# Patient Record
Sex: FEMALE | Race: BLACK OR AFRICAN AMERICAN | Employment: FULL TIME | URBAN - METROPOLITAN AREA
[De-identification: names, ages, dates, MRNs, and addresses within clinical notes are randomized per-mention and may not be internally consistent; named-entity substitution may affect disease eponyms.]

---
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
## 2023-01-10 ENCOUNTER — HOSPITAL ENCOUNTER (EMERGENCY)
Age: 18
Discharge: HOME | End: 2023-01-10
Attending: STUDENT IN AN ORGANIZED HEALTH CARE EDUCATION/TRAINING PROGRAM

## 2023-01-10 VITALS
WEIGHT: 117.5 LBS | OXYGEN SATURATION: 100 % | SYSTOLIC BLOOD PRESSURE: 117 MMHG | RESPIRATION RATE: 16 BRPM | HEART RATE: 60 BPM | DIASTOLIC BLOOD PRESSURE: 85 MMHG | TEMPERATURE: 98.6 F | HEIGHT: 65 IN | BODY MASS INDEX: 19.58 KG/M2

## 2023-01-10 DIAGNOSIS — K59.09 OTHER CONSTIPATION: ICD-10-CM

## 2023-01-10 DIAGNOSIS — R10.13 ABDOMINAL PAIN, EPIGASTRIC: Primary | ICD-10-CM

## 2023-01-10 LAB
ANION GAP SERPL CALC-SCNC: 10 MMOL/L (ref 5–15)
APPEARANCE UR: CLEAR
BACTERIA URNS QL MICRO: ABNORMAL /HPF
BASOPHILS # BLD: 0 K/UL (ref 0–0.1)
BASOPHILS NFR BLD: 1 % (ref 0–1)
BILIRUB UR QL: NEGATIVE
BUN SERPL-MCNC: 5 MG/DL (ref 6–20)
BUN/CREAT SERPL: 6 (ref 12–20)
CALCIUM SERPL-MCNC: 9.5 MG/DL (ref 8.5–10.1)
CHLORIDE SERPL-SCNC: 101 MMOL/L (ref 97–108)
CO2 SERPL-SCNC: 28 MMOL/L (ref 21–32)
COLOR UR: ABNORMAL
CREAT SERPL-MCNC: 0.87 MG/DL (ref 0.55–1.02)
DIFFERENTIAL METHOD BLD: ABNORMAL
EOSINOPHIL # BLD: 0.1 K/UL (ref 0–0.4)
EOSINOPHIL NFR BLD: 2 % (ref 0–7)
EPITH CASTS URNS QL MICRO: ABNORMAL /LPF
ERYTHROCYTE [DISTWIDTH] IN BLOOD BY AUTOMATED COUNT: 11.7 % (ref 11.5–14.5)
GLUCOSE SERPL-MCNC: 94 MG/DL (ref 65–100)
GLUCOSE UR STRIP.AUTO-MCNC: NEGATIVE MG/DL
HCG UR QL: NEGATIVE
HCT VFR BLD AUTO: 42.3 % (ref 35–47)
HGB BLD-MCNC: 14.4 G/DL (ref 11.5–16)
HGB UR QL STRIP: NEGATIVE
IMM GRANULOCYTES # BLD AUTO: 0 K/UL (ref 0–0.04)
IMM GRANULOCYTES NFR BLD AUTO: 1 % (ref 0–0.5)
KETONES UR QL STRIP.AUTO: NEGATIVE MG/DL
LEUKOCYTE ESTERASE UR QL STRIP.AUTO: ABNORMAL
LIPASE SERPL-CCNC: 110 U/L (ref 73–393)
LYMPHOCYTES # BLD: 1.4 K/UL (ref 0.8–3.5)
LYMPHOCYTES NFR BLD: 36 % (ref 12–49)
MCH RBC QN AUTO: 28.9 PG (ref 26–34)
MCHC RBC AUTO-ENTMCNC: 34 G/DL (ref 30–36.5)
MCV RBC AUTO: 84.9 FL (ref 80–99)
MONOCYTES # BLD: 0.6 K/UL (ref 0–1)
MONOCYTES NFR BLD: 14 % (ref 5–13)
NEUTS SEG # BLD: 1.8 K/UL (ref 1.8–8)
NEUTS SEG NFR BLD: 46 % (ref 32–75)
NITRITE UR QL STRIP.AUTO: NEGATIVE
NRBC # BLD: 0 K/UL (ref 0–0.01)
NRBC BLD-RTO: 0 PER 100 WBC
PH UR STRIP: 6.5 (ref 5–8)
PLATELET # BLD AUTO: 262 K/UL (ref 150–400)
PMV BLD AUTO: 10.8 FL (ref 8.9–12.9)
POTASSIUM SERPL-SCNC: 4.4 MMOL/L (ref 3.5–5.1)
PROT UR STRIP-MCNC: NEGATIVE MG/DL
RBC # BLD AUTO: 4.98 M/UL (ref 3.8–5.2)
RBC #/AREA URNS HPF: ABNORMAL /HPF (ref 0–5)
SODIUM SERPL-SCNC: 139 MMOL/L (ref 136–145)
SP GR UR REFRACTOMETRY: <1.005
UROBILINOGEN UR QL STRIP.AUTO: 0.2 EU/DL (ref 0.2–1)
WBC # BLD AUTO: 3.9 K/UL (ref 3.6–11)
WBC URNS QL MICRO: ABNORMAL /HPF (ref 0–4)

## 2023-01-10 PROCEDURE — 83690 ASSAY OF LIPASE: CPT

## 2023-01-10 PROCEDURE — 81025 URINE PREGNANCY TEST: CPT

## 2023-01-10 PROCEDURE — 74011000250 HC RX REV CODE- 250: Performed by: PHYSICIAN ASSISTANT

## 2023-01-10 PROCEDURE — 81001 URINALYSIS AUTO W/SCOPE: CPT

## 2023-01-10 PROCEDURE — 74011250637 HC RX REV CODE- 250/637: Performed by: PHYSICIAN ASSISTANT

## 2023-01-10 PROCEDURE — 99283 EMERGENCY DEPT VISIT LOW MDM: CPT

## 2023-01-10 PROCEDURE — 80048 BASIC METABOLIC PNL TOTAL CA: CPT

## 2023-01-10 PROCEDURE — 85025 COMPLETE CBC W/AUTO DIFF WBC: CPT

## 2023-01-10 RX ORDER — FAMOTIDINE 20 MG/1
20 TABLET, FILM COATED ORAL 2 TIMES DAILY
Qty: 20 TABLET | Refills: 0 | Status: SHIPPED | OUTPATIENT
Start: 2023-01-10 | End: 2023-01-20

## 2023-01-10 RX ORDER — POLYETHYLENE GLYCOL 1000
1 POWDER (GRAM) MISCELLANEOUS
Qty: 500 G | Refills: 0 | Status: SHIPPED | OUTPATIENT
Start: 2023-01-10 | End: 2023-01-15

## 2023-01-10 RX ORDER — MAG HYDROX/ALUMINUM HYD/SIMETH 200-200-20
30 SUSPENSION, ORAL (FINAL DOSE FORM) ORAL
Qty: 354 ML | Refills: 0 | Status: SHIPPED | OUTPATIENT
Start: 2023-01-10 | End: 2023-01-15

## 2023-01-10 RX ORDER — FAMOTIDINE 20 MG/1
20 TABLET, FILM COATED ORAL
Status: COMPLETED | OUTPATIENT
Start: 2023-01-10 | End: 2023-01-10

## 2023-01-10 RX ADMIN — LIDOCAINE HYDROCHLORIDE 40 ML: 20 SOLUTION TOPICAL at 18:36

## 2023-01-10 RX ADMIN — FAMOTIDINE 20 MG: 20 TABLET ORAL at 18:35

## 2023-01-10 NOTE — ED NOTES
Pt presents to ED ambulatory complaining of abd pain with nausea x 3-4 days. Pt is alert and oriented x 4, RR even and unlabored, skin is warm and dry. Assessment completed and pt updated on plan of care. Call bell in reach. Emergency Department Nursing Plan of Care       The Nursing Plan of Care is developed from the Nursing assessment and Emergency Department Attending provider initial evaluation. The plan of care may be reviewed in the ED Provider note.     The Plan of Care was developed with the following considerations:   Patient / Family readiness to learn indicated by:verbalized understanding  Persons(s) to be included in education: patient  Barriers to Learning/Limitations:No    Signed     Pricilan Post    1/10/2023   5:59 PM

## 2023-01-10 NOTE — ED PROVIDER NOTES
[facility] EMERGENCY DEPT  EMERGENCY DEPARTMENT ENCOUNTER       Pt Name: Alondra Awad  MRN: [id number]  [place] 2005  Date of evaluation: 1/10/2023  Provider: CHRISTO Gan   PCP: Silvina Roberts MD  Note Started: 6:12 PM 1/10/23     ED attending involment: I have seen and evaluated the patient. My supervision physician was available for consultation. CHIEF COMPLAINT       Chief Complaint   Patient presents with    Abdominal Pain        HISTORY OF PRESENT ILLNESS: 1 or more elements      History From: Patient  HPI Limitations : None     Alondra Awad is a 21 y.o. female who presents to the ED with epigastric pain. This began gradually 4 days ago. She describes a tight, achy pain that has been intermittent begins approximately 5 to 10 minutes after eating. This lasts approximately 2 hours and then resolve spontaneously. She has had decreased appetite as a result. She does report constipation, and had a small bowel movement yesterday. She has not taken any medications for symptoms. She denies any other symptoms, to include fevers, headaches, dizziness, myalgias, chest pain, shortness of breath, cough, nausea, vomiting, diarrhea, hematochezia, melena, dysuria, hematuria, rash. She denies any illicit drug or alcohol use. She denies any past surgeries. Nursing Notes were all reviewed and agreed with or any disagreements were addressed in the HPI. REVIEW OF SYSTEMS      Review of Systems   Constitutional:  Negative for chills and fever. HENT:  Negative for congestion and sore throat. Respiratory:  Negative for cough and shortness of breath. Cardiovascular:  Negative for chest pain. Gastrointestinal:  Positive for abdominal pain. Negative for diarrhea, nausea and vomiting. Genitourinary:  Negative for dysuria and hematuria. Musculoskeletal:  Negative for myalgias. Skin:  Negative for rash. Neurological:  Negative for headaches. All other systems reviewed and are negative. Positives and Pertinent negatives as per HPI. PAST HISTORY     Past Medical History:  History reviewed. No pertinent past medical history. Past Surgical History:  No past surgical history on file. Family History:  History reviewed. No pertinent family history. Social History:  Social History     Tobacco Use    Smoking status: Unknown   Substance Use Topics    Drug use: Never       Allergies:  No Known Allergies    CURRENT MEDICATIONS      Previous Medications    DICYCLOMINE (BENTYL) 20 MG TABLET    Take 1 Tablet by mouth every six (6) hours as needed for Abdominal Cramps. ONDANSETRON (ZOFRAN ODT) 4 MG DISINTEGRATING TABLET    Take 1 Tablet by mouth every eight (8) hours as needed for Nausea or Vomiting. PHYSICAL EXAM      ED Triage Vitals [01/10/23 1559]   ED Encounter Vitals Group      /85      Pulse (Heart Rate) 60      Resp Rate 16      Temp 98.6 °F (37 °C)      Temp src       O2 Sat (%) 100 %      Weight 117 lb 8.1 oz      Height 5' 5\"        Physical Exam  Vitals and nursing note reviewed. Constitutional:       Appearance: Normal appearance. Comments: Patient well-appearing, nontoxic. She is resting comfortably in stretcher   HENT:      Head: Normocephalic and atraumatic. Right Ear: External ear normal.      Left Ear: External ear normal.      Nose: Nose normal.      Mouth/Throat:      Pharynx: Oropharynx is clear. Eyes:      Conjunctiva/sclera: Conjunctivae normal.   Cardiovascular:      Rate and Rhythm: Normal rate and regular rhythm. Pulses: Normal pulses. Pulmonary:      Effort: Pulmonary effort is normal.      Breath sounds: Normal breath sounds. Abdominal:      General: Abdomen is flat. Palpations: Abdomen is soft. Comments: Abdomen normal to inspection. Mild epigastric tenderness. No focal tenderness otherwise. No guarding, rigidity or rebound tenderness. No McBurney's point tenderness. Negative Bullock sign.   No CVA tenderness bilaterally. Musculoskeletal:         General: No swelling. Cervical back: Normal range of motion and neck supple. Skin:     General: Skin is warm. Capillary Refill: Capillary refill takes less than 2 seconds. Findings: No erythema or rash. Neurological:      General: No focal deficit present. Mental Status: She is alert. Psychiatric:         Mood and Affect: Mood normal.         Behavior: Behavior normal.         Thought Content: Thought content normal.         Judgment: Judgment normal.        DIAGNOSTIC RESULTS   LABS:     No results found for this or any previous visit (from the past 12 hour(s)). RADIOLOGY:  Non-plain film images such as CT, Ultrasound and MRI are read by the radiologist. Plain radiographic images are visualized and preliminarily interpreted by the ED Provider with the below findings:        Interpretation per the Radiologist below, if available at the time of this note:     No results found. PROCEDURES   Unless otherwise noted below, none  Procedures     EMERGENCY DEPARTMENT COURSE and DIFFERENTIAL DIAGNOSIS/MDM   Vitals:    Vitals:    01/10/23 1559   BP: 117/85   Pulse: 60   Resp: 16   Temp: 98.6 °F (37 °C)   SpO2: 100%   Weight: 53.3 kg (117 lb 8.1 oz)   Height: 5' 5\" (1.651 m)        Patient was given the following medications:  Medications   mylanta/viscous lidocaine (GI COCKTAIL) (has no administration in time range)   famotidine (PEPCID) tablet 20 mg (has no administration in time range)       CONSULTS: (Who and What was discussed)  None    Chronic Conditions: none    Social Determinants affecting Dx or Tx: None    Records Reviewed (source and summary): Nursing Notes    MDM (CC/HPI Summary, DDx, ED Course, Reassessment, Disposition Considerations -Tests not done, Shared Decision Making, Pt Expectation of Test or Tx.): Patient evaluated for a 4-day history of an achy epigastric pain that is provoked with eating.   She is otherwise healthy with no comorbidities. She is afebrile, well-appearing with a benign abdominal exam.  Differential diagnosis includes but not limited to, dyspepsia, PUD, hepatobiliary disease, pancreatitis , costochondritis, pneumonia, ACS, hernia, appendicitis. patient not have any red flag symptoms for an acute intra-abdominal process. Given her young age, lack of risk factors, and symptoms that are inconsistent with a cardiopulmonary process, I have low suspicion for this requiring further work-up or evaluation in the ED. Patient's symptoms are easily provoked with eating, and I suspect is likely gastritis versus PUD. Her lab work is unremarkable with no signs of gallbladder or hepatobiliary involvement. Lipase within normal limits. Patient having mild constipation but no evidence of bowel obstruction. She was advised on symptomatic care and will be treated polyethylene glycol. Patient advised on the diagnostic uncertainty of her work-up and the need to follow-up with PCP for further evaluation. Through shared decision making will defer CT imaging at this time. We will being treated empirically for gastritis. She was given return precautions to the ED. Patient expressed understanding agreement the discharge structures and treatment plan             FINAL IMPRESSION   No diagnosis found. DISPOSITION/PLAN           Care plan outlined and precautions discussed. Patient has no new complaints, changes, or physical findings. Results of labs were reviewed with the patient. All medications were reviewed with the patient; will d/c home with Pepcid, Mylanta, polyethylene glycol. All of pt's questions and concerns were addressed. Patient was instructed and agrees to follow up with PCP, as well as to return to the ED upon further deterioration. Patient is ready to go home.            PATIENT REFERRED TO:  Follow-up Information    None           DISCHARGE MEDICATIONS:  Current Discharge Medication List            DISCONTINUED MEDICATIONS:  Current Discharge Medication List          I am the Primary Clinician of Record. CHRISTO Arredondo (electronically signed)    (Please note that parts of this dictation were completed with voice recognition software. Quite often unanticipated grammatical, syntax, homophones, and other interpretive errors are inadvertently transcribed by the computer software. Please disregards these errors.  Please excuse any errors that have escaped final proofreading.)

## 2023-01-10 NOTE — ED TRIAGE NOTES
Pt presents to ED reporting mid, upper abdominal pain x 3-4 days. Denies V/D, endorses nausea. Denies concern for pregnancy. Reports irregular bowel movements, last BM 1/9/2023. Describes the pain as \"tight\" like her stomach is bloated.

## 2023-01-11 NOTE — DISCHARGE INSTRUCTIONS
Local Primary Care Physicians     [facility] [phone]   MD Laura Tracy MD Chevis Milch, MD    [facility] Doctors [phone]   Saba Doan, [facility]   MD Roberta Corona MD Hermine Deeds, MD [address]  [phone]   John Arita, MD Gasper Marcelo MD     [address] [facility] [phone]   MD Anirudh Borja MD Briana Rower, MD Luberta Pastel, MD     [facility] [phone]   [facility] SPQQJR KK, MD Carmen Traylor, MD Clara Babinski, NP    [address] [phone]   MD Noe Ralph MD Mallory Adas, MD Cuca Vega, MD Destiny Warner, MD Carlos Ahmadi MD     [address] 57 [facility] MD Reji    [facility] [phone]   MD Tan Melvin, NP   Carron Moritz, MD [address] MD Lawanna Leech, MD     [address] [facility] [phone]   MD Krishna Bearden, P   Andie Ritter, NP   Malissa Leary, MD Kirill Matos, MD Katelyn Sinclair, MD Jose Nagy MD    [facility] [phone]   MD Gabby Campoverde MD Selinda Lob, MD Arleta Ast, MD Viktoria Pickup, MD     [address] [phone]   MD Jose Armando Lloyd MD [place] [phone]   MD Tali Louise MD Jannet Dear, MD     [facility] [phone]   Sheilah Raman, MD Ardith Apley, MD Yomi Kang, MD Waldo Bliss, MD Aissatou Talley, MD Junior Iglesias, NP   Luisana Luther MD     [address] [place] [address]   [phone]   MD Mary Bloom MD     [address] [facility], MD Rojelio Moya, SHELDON Nolan MAYRA Castillo FNP Nona Sierras, PA-C Myla Arms, MD Woody Copes, YAS Davis, [address] [place] [address] Departments    For adult and child immunizations, family planning, TB screening, STD testing and women's health services. [facility] CENTER:  [place]  [phone]   [place]  [address]  [facility] 1822   [facility]   729 [facility]:  [facility]  [address] [facility] [phone]   [address] [facility]        [address] [address]    For primary care services, woman and child wellness, and some clinics providing specialty care. [facility] -- [facility].   [facility]5 [facility]  [phone]    [address] [place] [address]   200 [facility] 3614 [facility] [phone]    339 [facility]  [address] [place] [address]   [phone]    [address]  [employer] [address] [place] [address]  5850 [facility]    [phone]    [facility] [address] [address] [place]  [phone]    [facility]  [address] [facility]  [phone]    [address] [phone]    [facility]:  [facility] [address], [address] [facility] [phone]  [place] [address] [phone]    [facility] Outreach   58[address] [facility]  [phone]  [employer]  [address], [address] ([email or web address]) [phone]       Sexual Health/Woman Wellness Clinics   For STD/HIV testing and treatment, pregnancy testing and services, men's health, birth control services, LGBT services, and hepatitis/HPV vaccine services. [facility] [employer] [address] [facility] 1900 [facility] [address] [facility] [address]  Leticia Kaba [phone]  [address], 5th floor [phone]  Pregnancy \[facility] [place] [facility]\"" of 59 [facility] [address] [phone]       8260 [facility] 401 [facility],[address]   [phone]  [place]   [phone]    Women, Infant and Children's Services:   [address] [phone]  [address] [phone]  [facility]   [phone]  [address] [place] [address]   [phone]  [facility]     [phone]  [address] Crisis   [address] [phone]        Miscellaneous: Thank you for allowing us to provide you with excellent care today. We hope we addressed all of your concerns and needs. We strive to provide excellent quality care in the Emergency Department. Please rate us as excellent, as anything less than excellent does not meet our expectations. If you feel that you have not received excellent quality care or timely care, please ask to speak to the nurse manager. Please choose us in the future for your continued health care needs. The exam and treatment you received in the Emergency Department were for an urgent problem and are not intended as complete care. It is important that you follow up with a doctor, nurse practitioner, or physician assistant for ongoing care. If your symptoms become worse or you do not improve as expected and you are unable to reach your usual health care provider, you should return to the Emergency Department. We are available 24 hours a day. Take this sheet with you when you go to your follow-up visit. If you have any problem arranging the follow-up visit, contact the Emergency Department immediately. If a prescription has been provided, please have it filled as soon as possible to avoid a delay in treatment. Read the entire medication instruction sheet provided to you by the pharmacy.  If you have any questions or reservations about taking the medication due to side effects or interactions with other medications please call the ER or your primary care physician. Take this sheet with you when you go to your follow-up visit. Make an appointment with your family doctor or the physician you were referred to for follow-up of this visit, as this is mandatory follow-up. Return to the ER if you are unable to be seen or if you are unable to be seen in a timely manner. If you have any problem arranging the follow-up visit, contact the Emergency Department immediately.

## 2023-02-15 ENCOUNTER — APPOINTMENT (OUTPATIENT)
Dept: GENERAL RADIOLOGY | Age: 18
End: 2023-02-15
Attending: EMERGENCY MEDICINE
Payer: COMMERCIAL

## 2023-02-15 ENCOUNTER — HOSPITAL ENCOUNTER (EMERGENCY)
Age: 18
Discharge: HOME | End: 2023-02-15
Attending: EMERGENCY MEDICINE
Payer: COMMERCIAL

## 2023-02-15 VITALS
DIASTOLIC BLOOD PRESSURE: 62 MMHG | RESPIRATION RATE: 16 BRPM | BODY MASS INDEX: 19.17 KG/M2 | HEIGHT: 65 IN | TEMPERATURE: 98.3 F | WEIGHT: 115.08 LBS | SYSTOLIC BLOOD PRESSURE: 113 MMHG | OXYGEN SATURATION: 98 % | HEART RATE: 50 BPM

## 2023-02-15 DIAGNOSIS — R10.30 LOWER ABDOMINAL PAIN: Primary | ICD-10-CM

## 2023-02-15 DIAGNOSIS — B37.41 YEAST CYSTITIS: ICD-10-CM

## 2023-02-15 LAB
APPEARANCE UR: ABNORMAL
BACTERIA URNS QL MICRO: ABNORMAL /HPF
BILIRUB UR QL: NEGATIVE
COLOR UR: ABNORMAL
EPITH CASTS URNS QL MICRO: ABNORMAL /LPF
GLUCOSE UR STRIP.AUTO-MCNC: NEGATIVE MG/DL
HCG UR QL: NEGATIVE
HGB UR QL STRIP: NEGATIVE
KETONES UR QL STRIP.AUTO: ABNORMAL MG/DL
LEUKOCYTE ESTERASE UR QL STRIP.AUTO: ABNORMAL
NITRITE UR QL STRIP.AUTO: NEGATIVE
PH UR STRIP: 5.5 (ref 5–8)
PROT UR STRIP-MCNC: ABNORMAL MG/DL
RBC #/AREA URNS HPF: ABNORMAL /HPF (ref 0–5)
SP GR UR REFRACTOMETRY: 1.02
UA: UC IF INDICATED,UAUC: ABNORMAL
UROBILINOGEN UR QL STRIP.AUTO: 1 EU/DL (ref 0.2–1)
WBC URNS QL MICRO: ABNORMAL /HPF (ref 0–4)
YEAST URNS QL MICRO: PRESENT

## 2023-02-15 PROCEDURE — 81001 URINALYSIS AUTO W/SCOPE: CPT

## 2023-02-15 PROCEDURE — 99284 EMERGENCY DEPT VISIT MOD MDM: CPT

## 2023-02-15 PROCEDURE — 87147 CULTURE TYPE IMMUNOLOGIC: CPT

## 2023-02-15 PROCEDURE — 87086 URINE CULTURE/COLONY COUNT: CPT

## 2023-02-15 PROCEDURE — 81025 URINE PREGNANCY TEST: CPT

## 2023-02-15 PROCEDURE — 74019 RADEX ABDOMEN 2 VIEWS: CPT

## 2023-02-15 RX ORDER — FLUCONAZOLE 150 MG/1
150 TABLET ORAL
Qty: 1 TABLET | Refills: 0 | Status: SHIPPED | OUTPATIENT
Start: 2023-02-15 | End: 2023-02-15

## 2023-02-15 RX ORDER — DICYCLOMINE HYDROCHLORIDE 20 MG/1
20 TABLET ORAL
Qty: 20 TABLET | Refills: 0 | Status: SHIPPED | OUTPATIENT
Start: 2023-02-15

## 2023-02-15 NOTE — LETTER
2/16/2023      August Huffman  [address] [address]      Dear Ms. Hiren Trujillo were seen in the Emergency Department of [address] [place] [address] on 2/15/23 and had lab tests performed. We would like to discuss these results with you . Please call the Emergency Department at your earliest convenience at [phone], to speak with one of our providers. The Urine culture from your Emergency Department visit on 2/15/23 was positive. You will need an antibiotic so please contact the Emergency Department at [phone].       Sincerely,    Ender Magana PA-C    [facility] EMERGENCY DEPT  [address] [facility] [address] [phone]

## 2023-02-15 NOTE — ED TRIAGE NOTES
Triage Note: Patient arrives to ER complaining of lower abdominal pain and intermittent vomiting x few days. Also complains of some dysuria. LMP: estimated 2/22/23, states she is on birth control.

## 2023-02-15 NOTE — ED PROVIDER NOTES
EMERGENCY DEPARTMENT HISTORY AND PHYSICAL EXAM      Patient Name: Naif Hicks  MRN: [id number]  YOB: 2005    Provider: Jem Fernández MD  PCP: Ryne Oglesby MD     Time/Date of evaluation: 11:19 AM 2/15/23      History of Presenting Illness     Chief Complaint   Patient presents with    Abdominal Pain       History Provided By: Patient     History Debera Cabot): Naif Hicks is a 21 y.o. female with no contributory PMHx who presents to the emergency department (room 12) by POV C/O lower abdominal pain for the past week is worsened over the past few days. Patient also complains of mild dysuria. She denies any urinary frequency, hematuria, vaginal discharge, fevers, or chills. She does tell me she has had difficulty moving her bowels recently and had a small bowel movement yesterday that was \"insignificant\". Past History     Past Medical History:  History reviewed. No pertinent past medical history. Past Surgical History:  History reviewed. No pertinent surgical history. Family History:  History reviewed. No pertinent family history. Social History:  Social History     Tobacco Use    Smoking status: Never     Passive exposure: Never    Smokeless tobacco: Never   Vaping Use    Vaping Use: Never used   Substance Use Topics    Alcohol use: Never    Drug use: Never       Medications:  Current Outpatient Medications   Medication Sig Dispense Refill    dicyclomine (BENTYL) 20 mg tablet Take 1 Tablet by mouth every six (6) hours as needed for Abdominal Cramps. 20 Tablet 0    PEG 3350-Electrolytes (COLYTE;GOLYTELY) solr Drink 6 ounces every 20 minutes until you have had 2-3 bowel movements  Indications: Bowel evacuation 4000 mL 0    fluconazole (Diflucan) 150 mg tablet Take 1 Tablet by mouth now for 1 dose. FDA advises cautious prescribing of oral fluconazole in pregnancy.  1 Tablet 0       Allergies:  No Known Allergies    Social Determinants of Health:  Social Determinants of Health Tobacco Use: Low Risk     Smoking Tobacco Use: Never    Smokeless Tobacco Use: Never    Passive Exposure: Never   Alcohol Use: Not on file   Financial Resource Strain: Not on file   Food Insecurity: Not on file   Transportation Needs: Not on file   Physical Activity: Not on file   Stress: Not on file   Social Connections: Not on file   Intimate Partner Violence: Not on file   Depression: Not on file   Housing Stability: Not on file       Review of Systems     Review of Systems   Constitutional:  Negative for chills and fever. HENT:  Negative for congestion, rhinorrhea, sneezing and sore throat. Eyes:  Negative for redness and visual disturbance. Respiratory:  Negative for shortness of breath. Cardiovascular:  Negative for leg swelling. Gastrointestinal:  Positive for abdominal pain and constipation. Negative for nausea and vomiting. Genitourinary:  Positive for dysuria. Negative for difficulty urinating and frequency. Musculoskeletal:  Negative for back pain, myalgias and neck stiffness. Skin:  Negative for rash. Neurological:  Negative for dizziness, syncope, weakness and headaches. Hematological:  Negative for adenopathy. All other systems reviewed and are negative. Physical Exam     Patient Vitals for the past 24 hrs:   Temp Pulse Resp BP SpO2   02/15/23 1057 98.3 °F (36.8 °C) (!) 50 16 113/62 98 %       Physical Exam  Vitals and nursing note reviewed. Constitutional:       Appearance: Normal appearance. She is well-developed. HENT:      Head: Normocephalic and atraumatic. Eyes:      Conjunctiva/sclera: Conjunctivae normal.   Cardiovascular:      Rate and Rhythm: Regular rhythm. Bradycardia present. Pulses: Normal pulses. Heart sounds: Normal heart sounds, S1 normal and S2 normal.   Pulmonary:      Effort: Pulmonary effort is normal. No respiratory distress. Breath sounds: Normal breath sounds. No wheezing.    Abdominal:      General: Bowel sounds are normal. There is no distension. Palpations: Abdomen is soft. Tenderness: There is no abdominal tenderness. There is no rebound. Musculoskeletal:         General: Normal range of motion. Cervical back: Full passive range of motion without pain, normal range of motion and neck supple. Skin:     General: Skin is warm and dry. Findings: No rash. Neurological:      Mental Status: She is alert and oriented to person, place, and time. Psychiatric:         Speech: Speech normal.         Behavior: Behavior normal.         Thought Content: Thought content normal.         Judgment: Judgment normal.       Diagnostic Study Results     Labs:  Recent Results (from the past 12 hour(s))   URINALYSIS W/ REFLEX CULTURE    Collection Time: 02/15/23 11:32 AM    Specimen: Urine   Result Value Ref Range    Color YELLOW/STRAW      Appearance TURBID (A) CLEAR      Specific gravity 1.025      pH (UA) 5.5 5.0 - 8.0      Protein TRACE (A) NEG mg/dL    Glucose Negative NEG mg/dL    Ketone TRACE (A) NEG mg/dL    Bilirubin Negative NEG      Blood Negative NEG      Urobilinogen 1.0 0.2 - 1.0 EU/dL    Nitrites Negative NEG      Leukocyte Esterase MODERATE (A) NEG      WBC 10-20 0 - 4 /hpf    RBC 0-5 0 - 5 /hpf    Epithelial cells MANY (A) FEW /lpf    Bacteria 1+ (A) NEG /hpf    UA:UC IF INDICATED URINE CULTURE ORDERED (A) CNI      Yeast PRESENT (A) NEG     HCG URINE, QL. - POC    Collection Time: 02/15/23 11:37 AM   Result Value Ref Range    Pregnancy test,urine (POC) Negative NEG       Radiologic Studies: KUB independently interpreted by me shows a moderate amount of stool with a nonobstructive bowel gas pattern. XR ABD FLAT/ ERECT   Final Result   Normal abdomen. ED Course     11:19 AM I Ion Zhang MD) am the first provider for this patient. Initial assessment performed.  I reviewed the vital signs, available nursing notes, past medical history, past surgical history, family history and social history. The patients presenting problems have been discussed, and they are in agreement with the care plan formulated and outlined with them. I have encouraged them to ask questions as they arise throughout their visit. Records Reviewed: Nursing Notes, Old Medical Records, Previous Radiology Studies, and Previous Laboratory Studies    MEDICATIONS ADMINISTERED IN THE ED:  Medications - No data to display    UA is positive for yeast as well as 1+ bacteria and moderate epithelial cells. Her KUB shows stool throughout the small and large intestine consistent with her complaints of constipation. Will discharge with a prescription for Diflucan and polyethylene glycol for bowel evacuation. Medical Decision Making     DDX: Constipation, UTI, yeast vaginitis, STI, pregnancy, ovarian cyst    DISCUSSION:  This appears to be a moderate condition. This appears to be an acute condition. 21 y.o. female presents with lower abdominal pain for the past week that has worsened over the past few days. She has a benign abdominal exam and has no guarding or rebound. She has a nonsurgical abdomen. We will check a point-of-care pregnancy, urinalysis, and obtain a KUB (flat and upright) and reassess. The decision to perform testing and results were discussed with the patient. I discussed each of these tests and considerations with the patient. The patient agrees with the plan of discharge. Additional Considerations:  None    Diagnosis and Disposition     DISCHARGE NOTE:  Scar Yeager's results have been reviewed with her. She has been counseled regarding her diagnosis, treatment, and plan. She verbally conveys understanding and agreement of the signs, symptoms, diagnosis, treatment and prognosis and additionally agrees to follow up as discussed. She also agrees with the care-plan and conveys that all of her questions have been answered.  I have also provided discharge instructions for her that include: educational information regarding their diagnosis and treatment, and list of reasons why they would want to return to the ED prior to their follow-up appointment, should her condition change. She has been provided with education for proper emergency department utilization. CLINICAL IMPRESSION:    1. Lower abdominal pain    2. Yeast cystitis        PLAN:  1. D/C Home  2. Current Discharge Medication List        START taking these medications    Details   PEG 3350-Electrolytes (COLYTE;GOLYTELY) solr Drink 6 ounces every 20 minutes until you have had 2-3 bowel movements  Indications: Bowel evacuation  Qty: 4000 mL, Refills: 0  Start date: 2/15/2023      fluconazole (Diflucan) 150 mg tablet Take 1 Tablet by mouth now for 1 dose. FDA advises cautious prescribing of oral fluconazole in pregnancy. Qty: 1 Tablet, Refills: 0  Start date: 2/15/2023, End date: 2/15/2023           CONTINUE these medications which have CHANGED    Details   dicyclomine (BENTYL) 20 mg tablet Take 1 Tablet by mouth every six (6) hours as needed for Abdominal Cramps. Qty: 20 Tablet, Refills: 0  Start date: 2/15/2023           3. Follow-up Information       Follow up With Specialties Details Why [address]  Call  to arrange primary care [address]  [address]    [address]  Call  to arrange primary care [facility]  [phone]    [facility] EMERGENCY DEPT Emergency Medicine  As needed, If symptoms worsen [address] [facility]          Lucien Lunsford MD am the primary clinician of record. Gamaliel Disclaimer     Please note that this dictation was completed with [email or web address], the computer voice recognition software. Quite often unanticipated grammatical, syntax, homophones, and other interpretive errors are inadvertently transcribed by the computer software. Please disregard these errors. Please excuse any errors that have escaped final proofreading.     eLann Gonzalez MD

## 2023-02-15 NOTE — ED NOTES
Emergency Department Nursing Plan of Care       The Nursing Plan of Care is developed from the Nursing assessment and Emergency Department Attending provider initial evaluation. The plan of care may be reviewed in the ED Provider note.     The Plan of Care was developed with the following considerations:   Patient / Family readiness to learn indicated by:verbalized understanding  Persons(s) to be included in education: patient  Barriers to Learning/Limitations:No    Signed     Yousif Wild RN    2/15/2023   11:42 AM

## 2023-02-16 LAB
BACTERIA SPEC CULT: ABNORMAL
BACTERIA SPEC CULT: ABNORMAL
CC UR VC: ABNORMAL
SERVICE CMNT-IMP: ABNORMAL

## 2023-08-20 ENCOUNTER — HOSPITAL ENCOUNTER (EMERGENCY)
Facility: HOSPITAL | Age: 18
Discharge: HOME | End: 2023-08-20
Attending: EMERGENCY MEDICINE
Payer: COMMERCIAL

## 2023-08-20 VITALS
WEIGHT: 120.5 LBS | TEMPERATURE: 98.5 F | SYSTOLIC BLOOD PRESSURE: 114 MMHG | OXYGEN SATURATION: 98 % | DIASTOLIC BLOOD PRESSURE: 67 MMHG | BODY MASS INDEX: 20.08 KG/M2 | HEART RATE: 77 BPM | RESPIRATION RATE: 16 BRPM | HEIGHT: 65 IN

## 2023-08-20 DIAGNOSIS — L30.9 LOCALIZED DERMATITIS: ICD-10-CM

## 2023-08-20 DIAGNOSIS — R21 RASH AND OTHER NONSPECIFIC SKIN ERUPTION: Primary | ICD-10-CM

## 2023-08-20 PROCEDURE — 99283 EMERGENCY DEPT VISIT LOW MDM: CPT

## 2023-08-20 PROCEDURE — 6370000000 HC RX 637 (ALT 250 FOR IP): Performed by: EMERGENCY MEDICINE

## 2023-08-20 RX ORDER — PREDNISONE 20 MG/1
60 TABLET ORAL
Status: COMPLETED | OUTPATIENT
Start: 2023-08-20 | End: 2023-08-20

## 2023-08-20 RX ORDER — DIPHENHYDRAMINE HCL 25 MG
50 TABLET ORAL EVERY 6 HOURS PRN
Qty: 30 TABLET | Refills: 0 | Status: SHIPPED | OUTPATIENT
Start: 2023-08-20 | End: 2023-09-19

## 2023-08-20 RX ORDER — DIAPER,BRIEF,INFANT-TODD,DISP
EACH MISCELLANEOUS
Qty: 28 G | Refills: 0 | Status: SHIPPED | OUTPATIENT
Start: 2023-08-20

## 2023-08-20 RX ADMIN — PREDNISONE 60 MG: 20 TABLET ORAL at 19:30

## 2023-08-20 ASSESSMENT — ENCOUNTER SYMPTOMS
VOMITING: 0
ABDOMINAL PAIN: 0
EYE PAIN: 0
COUGH: 0
RHINORRHEA: 0
NAUSEA: 0
DIARRHEA: 0
SHORTNESS OF BREATH: 0
SORE THROAT: 0

## 2023-08-20 ASSESSMENT — PAIN SCALES - GENERAL: PAINLEVEL_OUTOF10: 0

## 2023-08-20 ASSESSMENT — PAIN - FUNCTIONAL ASSESSMENT: PAIN_FUNCTIONAL_ASSESSMENT: 0-10

## 2023-08-20 NOTE — ED PROVIDER NOTES
EMERGENCY DEPARTMENT HISTORY AND PHYSICAL EXAM            Please note that this dictation was completed with the assistance of \"Dragon\", the computer voice recognition software. Quite often unanticipated grammatical, syntax, homophones, and other interpretive errors are inadvertently transcribed by the computer software. Please disregard these errors and any errors that have escaped final proofreading. Thank you. Date of Evaluation: 08/20/23  Patient: Katelin Villalpando  Patient Age and Sex: 21 y.o. female   MRN: [id number]  CSN: [id number]  PCP: PROVIDER UNKNOWN    History of Present Illness     Chief Complaint   Patient presents with    Rash     Per pt reports facial rash for unknown period of time, +itching and burning sensation. History Provided By: Patient/family/EMS (if available)    History is limited by: Nothing     HPI: Katelin Villalpando, 21 y.o. female with past medical history as documented below presents to the ED with c/o of 1 day history of mild to moderate facial rash that is itching and burning. Denies any new soaps, detergents or lotions. She did take Benadryl yesterday but it made her loopy so she stopped taking it. Denies any difficulty swallowing or voice changes. No new medications. Denies any respiratory complaints. . Pt denies any other exacerbating or ameliorating factors. There are no other complaints, changes or physical findings pertinent to the HPI at this time. Nursing Notes were all reviewed and agreed with or any disagreements were addressed in the HPI. Past History   Past Medical History:  No past medical history on file. Past Surgical History:  No past surgical history on file. Family History:   Family history reviewed and was non-contributory, unless specified below:  No family history on file.     Social History:  Social History     Tobacco Use    Smoking status: Never    Smokeless tobacco: Never   Substance Use Topics    Alcohol use: Never    Drug use: Never

## 2023-08-20 NOTE — ED NOTES
Patient displays even and unlabored breathing. Does not exhibit any signs of acute respiratory distress. Discharge instructions reviewed with patient. Patient did not have any further qeustions at this time. Pt is leaving dept in stable condition.        Georgia Schneider RN  08/20/23 1941

## 2023-08-22 ENCOUNTER — HOSPITAL ENCOUNTER (EMERGENCY)
Facility: HOSPITAL | Age: 18
Discharge: HOME | End: 2023-08-22
Attending: EMERGENCY MEDICINE
Payer: COMMERCIAL

## 2023-08-22 VITALS
OXYGEN SATURATION: 100 % | WEIGHT: 120 LBS | TEMPERATURE: 98.6 F | HEART RATE: 63 BPM | HEIGHT: 65 IN | RESPIRATION RATE: 16 BRPM | BODY MASS INDEX: 19.99 KG/M2 | SYSTOLIC BLOOD PRESSURE: 126 MMHG | DIASTOLIC BLOOD PRESSURE: 83 MMHG

## 2023-08-22 DIAGNOSIS — L30.9 DERMATITIS: Primary | ICD-10-CM

## 2023-08-22 PROCEDURE — 6370000000 HC RX 637 (ALT 250 FOR IP): Performed by: EMERGENCY MEDICINE

## 2023-08-22 PROCEDURE — 99283 EMERGENCY DEPT VISIT LOW MDM: CPT

## 2023-08-22 RX ORDER — DIPHENHYDRAMINE HCL 25 MG
25 CAPSULE ORAL
Status: COMPLETED | OUTPATIENT
Start: 2023-08-22 | End: 2023-08-22

## 2023-08-22 RX ORDER — METHYLPREDNISOLONE 4 MG/1
TABLET ORAL
Qty: 1 KIT | Refills: 0 | Status: SHIPPED | OUTPATIENT
Start: 2023-08-22 | End: 2023-08-28

## 2023-08-22 RX ORDER — METHYLPREDNISOLONE 4 MG/1
TABLET ORAL
Qty: 1 KIT | Refills: 0 | Status: SHIPPED | OUTPATIENT
Start: 2023-08-22 | End: 2023-08-22 | Stop reason: SDUPTHER

## 2023-08-22 RX ORDER — PREDNISONE 20 MG/1
60 TABLET ORAL
Status: COMPLETED | OUTPATIENT
Start: 2023-08-22 | End: 2023-08-22

## 2023-08-22 RX ADMIN — PREDNISONE 60 MG: 20 TABLET ORAL at 18:27

## 2023-08-22 RX ADMIN — DIPHENHYDRAMINE HYDROCHLORIDE 25 MG: 25 CAPSULE ORAL at 18:28

## 2023-08-22 ASSESSMENT — PAIN SCALES - GENERAL: PAINLEVEL_OUTOF10: 10

## 2023-08-22 ASSESSMENT — PAIN DESCRIPTION - DESCRIPTORS: DESCRIPTORS: ITCHING

## 2023-08-22 ASSESSMENT — PAIN - FUNCTIONAL ASSESSMENT: PAIN_FUNCTIONAL_ASSESSMENT: 0-10

## 2023-08-22 ASSESSMENT — PAIN DESCRIPTION - ORIENTATION: ORIENTATION: RIGHT

## 2023-08-22 ASSESSMENT — PAIN DESCRIPTION - LOCATION: LOCATION: ARM

## 2023-08-22 NOTE — ED PROVIDER NOTES
[facility] EMERGENCY DEPT  EMERGENCY DEPARTMENT ENCOUNTER       Pt Name: Marti Rueda  MRN: [id number]  [address] 2005  Date of evaluation: 8/22/2023  Provider: Lorrie Reno MD   PCP: PROVIDER UNKNOWN  Note Started: 6:07 PM 8/22/23     CHIEF COMPLAINT       Chief Complaint   Patient presents with    Allergic Reaction    Rash        HISTORY OF PRESENT ILLNESS: 1 or more elements      History From: ***, History limited by: ***none     Marti Rueda is a 21 y.o. female who presents right upper arm rash after getting a right upper arm tattoo on Saturday. Itching started a day or so later and then she noticed the rash after that. She had facial rash recently for which she was seen here and prescribed hydrocortisone cream.  Unsure if she might have touched base and spread to her arm. Unsure if it could have been the numbing cream or the tattoo ink. She has tried Benadryl cream over-the-counter with minimal relief. Denies rash elsewhere, wheeze, stridor lip swelling, tongue swelling sensation of throat closing. Denies fever or myalgias in that area     Nursing Notes were all reviewed and agreed with or any disagreements were addressed in the HPI. REVIEW OF SYSTEMS        Positives and Pertinent negatives as per HPI. PAST HISTORY     Past Medical History:  No past medical history on file. Past Surgical History:  No past surgical history on file. Family History:  No family history on file.     Social History:  Social History     Tobacco Use    Smoking status: Never    Smokeless tobacco: Never   Substance Use Topics    Alcohol use: Never    Drug use: Never       Allergies:  No Known Allergies    CURRENT MEDICATIONS      Previous Medications    DICYCLOMINE (BENTYL) 20 MG TABLET    Take 1 tablet by mouth every 6 hours as needed    DIPHENHYDRAMINE (BENADRYL ALLERGY) 25 MG TABLET    Take 2 tablets by mouth every 6 hours as needed for Allergies or Itching    HYDROCORTISONE 0.5 % CREAM    Apply topically 2 times symptoms worsen       DISCHARGE MEDICATIONS:     Medication List        START taking these medications      methylPREDNISolone 4 MG tablet  Commonly known as: MEDROL (MARGARITA)  Take by mouth. ASK your doctor about these medications      dicyclomine 20 MG tablet  Commonly known as: BENTYL     diphenhydrAMINE 25 MG tablet  Commonly known as: Benadryl Allergy  Take 2 tablets by mouth every 6 hours as needed for Allergies or Itching     hydrocortisone 0.5 % cream  Apply topically 2 times daily. polyethylene glycol 236 g solution  Commonly known as: GoLYTELY     prednisoLONE 5 MG (21) Tbpk  Use as directed               Where to Get Your Medications        These medications were sent to [address] [place] [address], [address] [address]      Phone: [phone]   methylPREDNISolone 4 MG tablet           DISCONTINUED MEDICATIONS:  Discharge Medication List as of 8/22/2023  6:22 PM          I am the Primary Clinician of Record. Nannette Buck MD (electronically signed)    (Please note that parts of this dictation were completed with voice recognition software. Quite often unanticipated grammatical, syntax, homophones, and other interpretive errors are inadvertently transcribed by the computer software. Please disregards these errors.  Please excuse any errors that have escaped final proofreading.)         Nannette Buck MD  08/28/23 0157

## 2023-08-22 NOTE — ED NOTES
Discharge instructions were given to the patient by Jaime Bower, Student RN. The patient left the Emergency Department alert and oriented and in no acute distress with 1 prescription(s). The patient was encouraged to call or return to the ED for worsening issues or problems and was encouraged to schedule a follow up appointment for continuing care. The patient verbalized understanding of discharge instructions and prescriptions; all questions were answered. The patient has no further concerns at this time.          Johana Hartmann  08/22/23 0374

## 2023-08-22 NOTE — ED TRIAGE NOTES
Pt arrives complaining of an allergic reaction that has caused her to get a bad rash on her right arm. Pt states she got a tattoo on Saturday and the rash showed up Sunday. Pt reports she did use numbing cream for the tattoo and the reaction could be from that.  Pt reports using a benadryl cream but states it is just getting worse

## 2023-11-06 ENCOUNTER — HOSPITAL ENCOUNTER (EMERGENCY)
Facility: HOSPITAL | Age: 18
Discharge: HOME | End: 2023-11-06
Payer: COMMERCIAL

## 2023-11-06 VITALS
HEIGHT: 65 IN | WEIGHT: 115 LBS | SYSTOLIC BLOOD PRESSURE: 108 MMHG | DIASTOLIC BLOOD PRESSURE: 71 MMHG | RESPIRATION RATE: 14 BRPM | BODY MASS INDEX: 19.16 KG/M2 | TEMPERATURE: 98.8 F | OXYGEN SATURATION: 100 % | HEART RATE: 75 BPM

## 2023-11-06 DIAGNOSIS — N76.0 BV (BACTERIAL VAGINOSIS): Primary | ICD-10-CM

## 2023-11-06 DIAGNOSIS — Z20.2 POSSIBLE EXPOSURE TO STD: ICD-10-CM

## 2023-11-06 DIAGNOSIS — B96.89 BV (BACTERIAL VAGINOSIS): Primary | ICD-10-CM

## 2023-11-06 LAB
APPEARANCE UR: CLEAR
BACTERIA URNS QL MICRO: ABNORMAL /HPF
BILIRUB UR QL: NEGATIVE
CLUE CELLS VAG QL WET PREP: NORMAL
COLOR UR: ABNORMAL
EPITH CASTS URNS QL MICRO: ABNORMAL /LPF
GLUCOSE UR STRIP.AUTO-MCNC: NEGATIVE MG/DL
HCG UR QL: NEGATIVE
HGB UR QL STRIP: ABNORMAL
KETONES UR QL STRIP.AUTO: NEGATIVE MG/DL
KOH PREP SPEC: NORMAL
LEUKOCYTE ESTERASE UR QL STRIP.AUTO: ABNORMAL
NITRITE UR QL STRIP.AUTO: NEGATIVE
PH UR STRIP: 6.5 (ref 5–8)
PROT UR STRIP-MCNC: NEGATIVE MG/DL
RBC #/AREA URNS HPF: ABNORMAL /HPF (ref 0–5)
SERVICE CMNT-IMP: NORMAL
SP GR UR REFRACTOMETRY: 1.01
T VAGINALIS VAG QL WET PREP: NORMAL
URINE CULTURE IF INDICATED: ABNORMAL
UROBILINOGEN UR QL STRIP.AUTO: 1 EU/DL (ref 0.2–1)
WBC URNS QL MICRO: ABNORMAL /HPF (ref 0–4)

## 2023-11-06 PROCEDURE — 99284 EMERGENCY DEPT VISIT MOD MDM: CPT

## 2023-11-06 PROCEDURE — 87210 SMEAR WET MOUNT SALINE/INK: CPT

## 2023-11-06 PROCEDURE — 2500000003 HC RX 250 WO HCPCS

## 2023-11-06 PROCEDURE — 87491 CHLMYD TRACH DNA AMP PROBE: CPT

## 2023-11-06 PROCEDURE — 6370000000 HC RX 637 (ALT 250 FOR IP)

## 2023-11-06 PROCEDURE — 96372 THER/PROPH/DIAG INJ SC/IM: CPT

## 2023-11-06 PROCEDURE — 81001 URINALYSIS AUTO W/SCOPE: CPT

## 2023-11-06 PROCEDURE — 6360000002 HC RX W HCPCS

## 2023-11-06 PROCEDURE — 87591 N.GONORRHOEAE DNA AMP PROB: CPT

## 2023-11-06 PROCEDURE — 81025 URINE PREGNANCY TEST: CPT

## 2023-11-06 RX ORDER — METRONIDAZOLE 500 MG/1
500 TABLET ORAL 2 TIMES DAILY
Qty: 14 TABLET | Refills: 0 | Status: SHIPPED | OUTPATIENT
Start: 2023-11-06 | End: 2023-11-13

## 2023-11-06 RX ORDER — DOXYCYCLINE HYCLATE 100 MG
100 TABLET ORAL 2 TIMES DAILY
Qty: 14 TABLET | Refills: 0 | Status: SHIPPED | OUTPATIENT
Start: 2023-11-06 | End: 2023-11-13

## 2023-11-06 RX ORDER — IBUPROFEN 600 MG/1
600 TABLET ORAL
Status: COMPLETED | OUTPATIENT
Start: 2023-11-06 | End: 2023-11-06

## 2023-11-06 RX ADMIN — IBUPROFEN 600 MG: 600 TABLET, FILM COATED ORAL at 20:11

## 2023-11-06 RX ADMIN — LIDOCAINE HYDROCHLORIDE 500 MG: 10 INJECTION, SOLUTION EPIDURAL; INFILTRATION; INTRACAUDAL; PERINEURAL at 20:11

## 2023-11-06 ASSESSMENT — ENCOUNTER SYMPTOMS
BACK PAIN: 1
ABDOMINAL PAIN: 1
NAUSEA: 0
VOMITING: 0
RESPIRATORY NEGATIVE: 1

## 2023-11-06 ASSESSMENT — LIFESTYLE VARIABLES
HOW OFTEN DO YOU HAVE A DRINK CONTAINING ALCOHOL: NEVER
HOW MANY STANDARD DRINKS CONTAINING ALCOHOL DO YOU HAVE ON A TYPICAL DAY: PATIENT DOES NOT DRINK

## 2023-11-06 ASSESSMENT — PAIN - FUNCTIONAL ASSESSMENT: PAIN_FUNCTIONAL_ASSESSMENT: NONE - DENIES PAIN

## 2023-11-06 NOTE — ED TRIAGE NOTES
Pt reports having abd and pelvic pain x2 weeks denies any N/V/D/C or urinary symptoms. Pt denies taking anything for her pain at this time and reports eating and drinking ok. Pt laughing in triage and in no acute distress.

## 2023-11-07 LAB
C TRACH DNA SPEC QL NAA+PROBE: NEGATIVE
N GONORRHOEA DNA SPEC QL NAA+PROBE: NEGATIVE
SAMPLE TYPE: NORMAL
SERVICE CMNT-IMP: NORMAL
SPECIMEN SOURCE: NORMAL

## 2023-11-07 NOTE — ED PROVIDER NOTES
treatment for gonorrhea and chlamydia. UA obtained and negative. Hcg was negative. Sent specimens for wet mount, GC, and chlamydia. Obtained wet prep, which revealed clue cells. Discussed with patient that it takes? days for results of GC/C cultures to be released and she will be contacted if they are positive. History, physical exam, laboratory, findings were discussed with the patient. At this time, it is felt that the most likely explanation for the patient's symptoms is bacterial vaginosis. I also considered PID, pregnancy, ectopic pregnancy, endometriosis, tubovarian abscess, appendicitis, UTI and pyelonephritis, but this appears less likely considering the data gathered thus far. Rocephin 500 mg IM administered in the ED empiric for gonorrhea. Prescribed Metronidazole 500 mg bid x7d (while refraining from EtOH consumption) for bacterial vaginosis. Prescribed doxycycline 100 mg twice daily for 7 days empiric treatment for chlamydia    Advised Pt on supportive measures, including advancing fluids and noncaffeinated beverages as tolerated, avoiding sexual intercourse until resolution of Sx, urinating soon after intercourse, refrain from delaying bladder evacuation, unscented soaps, gentle anterior-to-posterior wiping post-void, avoidance of irritating chemicals to the vagina and urethra such as strong soaps and hygienic sprays/douches, taking showers instead of baths, wearing cotton underwear for ventilation instead of nylon underwear and pantyhose and tight pants, avoiding wet bathing suits, adding yogurt w/ probiotics to diet, and avoiding longterm use of Ab. Disposition Considerations (Tests not done, Shared Decision Making, Pt Expectation of Test or Tx.): As above     FINAL IMPRESSION     1. BV (bacterial vaginosis)    2.  Possible exposure to STD          DISPOSITION/PLAN   DISPOSITION Decision To Discharge 11/06/2023 07:56:21 PM      Discharge Note: The patient is stable for

## 2023-11-07 NOTE — ED NOTES
Patient given copy of dc instructions and 2 script(s). Patient verbalized understanding of instructions and script (s). Patient given a current medication reconciliation form and verbalized understanding of their medications. Patient verbalized understanding of the importance of discussing medications with his or her physician or clinic they will be following up with. Patient alert and oriented and in no acute distress. Patient discharged home ambulatory with family friend.       Andrez Rae RN  11/06/23 2019

## 2024-04-07 ENCOUNTER — HOSPITAL ENCOUNTER (EMERGENCY)
Facility: HOSPITAL | Age: 19
Discharge: HOME | End: 2024-04-07
Attending: EMERGENCY MEDICINE
Payer: COMMERCIAL

## 2024-04-07 VITALS
SYSTOLIC BLOOD PRESSURE: 119 MMHG | DIASTOLIC BLOOD PRESSURE: 79 MMHG | OXYGEN SATURATION: 95 % | RESPIRATION RATE: 16 BRPM | HEART RATE: 70 BPM | HEIGHT: 65 IN | WEIGHT: 120 LBS | TEMPERATURE: 98.7 F | BODY MASS INDEX: 19.99 KG/M2

## 2024-04-07 DIAGNOSIS — N93.9 ABNORMAL VAGINAL BLEEDING: Primary | ICD-10-CM

## 2024-04-07 LAB
ALBUMIN SERPL-MCNC: 4.1 G/DL (ref 3.5–5)
ALBUMIN/GLOB SERPL: 1.2 (ref 1.1–2.2)
ALP SERPL-CCNC: 88 U/L (ref 45–117)
ALT SERPL-CCNC: 18 U/L (ref 12–78)
ANION GAP SERPL CALC-SCNC: 9 MMOL/L (ref 5–15)
APPEARANCE UR: CLEAR
AST SERPL-CCNC: 17 U/L (ref 15–37)
BACTERIA URNS QL MICRO: NEGATIVE /HPF
BASOPHILS # BLD: 0 K/UL (ref 0–0.1)
BASOPHILS NFR BLD: 1 % (ref 0–1)
BILIRUB SERPL-MCNC: 0.5 MG/DL (ref 0.2–1)
BILIRUB UR QL: NEGATIVE
BUN SERPL-MCNC: 6 MG/DL (ref 6–20)
BUN/CREAT SERPL: 6 (ref 12–20)
CALCIUM SERPL-MCNC: 9.4 MG/DL (ref 8.5–10.1)
CHLORIDE SERPL-SCNC: 105 MMOL/L (ref 97–108)
CO2 SERPL-SCNC: 30 MMOL/L (ref 21–32)
COLOR UR: ABNORMAL
CREAT SERPL-MCNC: 0.93 MG/DL (ref 0.55–1.02)
DIFFERENTIAL METHOD BLD: ABNORMAL
EOSINOPHIL # BLD: 0.1 K/UL (ref 0–0.4)
EOSINOPHIL NFR BLD: 2 % (ref 0–7)
EPITH CASTS URNS QL MICRO: ABNORMAL /LPF
ERYTHROCYTE [DISTWIDTH] IN BLOOD BY AUTOMATED COUNT: 12 % (ref 11.5–14.5)
GLOBULIN SER CALC-MCNC: 3.5 G/DL (ref 2–4)
GLUCOSE SERPL-MCNC: 96 MG/DL (ref 65–100)
GLUCOSE UR STRIP.AUTO-MCNC: NEGATIVE MG/DL
HCG UR QL: NEGATIVE
HCT VFR BLD AUTO: 39.7 % (ref 35–47)
HGB BLD-MCNC: 13 G/DL (ref 11.5–16)
HGB UR QL STRIP: ABNORMAL
IMM GRANULOCYTES # BLD AUTO: 0 K/UL (ref 0–0.04)
IMM GRANULOCYTES NFR BLD AUTO: 1 % (ref 0–0.5)
KETONES UR QL STRIP.AUTO: NEGATIVE MG/DL
LEUKOCYTE ESTERASE UR QL STRIP.AUTO: ABNORMAL
LIPASE SERPL-CCNC: 26 U/L (ref 13–75)
LYMPHOCYTES # BLD: 1.7 K/UL (ref 0.8–3.5)
LYMPHOCYTES NFR BLD: 41 % (ref 12–49)
MCH RBC QN AUTO: 28.4 PG (ref 26–34)
MCHC RBC AUTO-ENTMCNC: 32.7 G/DL (ref 30–36.5)
MCV RBC AUTO: 86.7 FL (ref 80–99)
MONOCYTES # BLD: 0.4 K/UL (ref 0–1)
MONOCYTES NFR BLD: 9 % (ref 5–13)
NEUTS SEG # BLD: 1.9 K/UL (ref 1.8–8)
NEUTS SEG NFR BLD: 46 % (ref 32–75)
NITRITE UR QL STRIP.AUTO: NEGATIVE
NRBC # BLD: 0 K/UL (ref 0–0.01)
NRBC BLD-RTO: 0 PER 100 WBC
PH UR STRIP: 8 (ref 5–8)
PLATELET # BLD AUTO: 258 K/UL (ref 150–400)
PMV BLD AUTO: 10.9 FL (ref 8.9–12.9)
POTASSIUM SERPL-SCNC: 3.8 MMOL/L (ref 3.5–5.1)
PROT SERPL-MCNC: 7.6 G/DL (ref 6.4–8.2)
PROT UR STRIP-MCNC: 30 MG/DL
RBC # BLD AUTO: 4.58 M/UL (ref 3.8–5.2)
RBC #/AREA URNS HPF: ABNORMAL /HPF (ref 0–5)
SODIUM SERPL-SCNC: 144 MMOL/L (ref 136–145)
SP GR UR REFRACTOMETRY: 1.01
URINE CULTURE IF INDICATED: ABNORMAL
UROBILINOGEN UR QL STRIP.AUTO: 0.2 EU/DL (ref 0.2–1)
WBC # BLD AUTO: 4.1 K/UL (ref 3.6–11)
WBC URNS QL MICRO: ABNORMAL /HPF (ref 0–4)

## 2024-04-07 PROCEDURE — 80053 COMPREHEN METABOLIC PANEL: CPT

## 2024-04-07 PROCEDURE — 83690 ASSAY OF LIPASE: CPT

## 2024-04-07 PROCEDURE — 96374 THER/PROPH/DIAG INJ IV PUSH: CPT

## 2024-04-07 PROCEDURE — 99284 EMERGENCY DEPT VISIT MOD MDM: CPT

## 2024-04-07 PROCEDURE — 81025 URINE PREGNANCY TEST: CPT

## 2024-04-07 PROCEDURE — 6360000002 HC RX W HCPCS: Performed by: EMERGENCY MEDICINE

## 2024-04-07 PROCEDURE — 81001 URINALYSIS AUTO W/SCOPE: CPT

## 2024-04-07 PROCEDURE — 96375 TX/PRO/DX INJ NEW DRUG ADDON: CPT

## 2024-04-07 PROCEDURE — 36415 COLL VENOUS BLD VENIPUNCTURE: CPT

## 2024-04-07 PROCEDURE — 85025 COMPLETE CBC W/AUTO DIFF WBC: CPT

## 2024-04-07 RX ORDER — ONDANSETRON 2 MG/ML
4 INJECTION INTRAMUSCULAR; INTRAVENOUS ONCE
Status: COMPLETED | OUTPATIENT
Start: 2024-04-07 | End: 2024-04-07

## 2024-04-07 RX ORDER — NAPROXEN 500 MG/1
500 TABLET ORAL 2 TIMES DAILY WITH MEALS
Qty: 20 TABLET | Refills: 0 | Status: SHIPPED | OUTPATIENT
Start: 2024-04-07

## 2024-04-07 RX ORDER — KETOROLAC TROMETHAMINE 30 MG/ML
15 INJECTION, SOLUTION INTRAMUSCULAR; INTRAVENOUS ONCE
Status: COMPLETED | OUTPATIENT
Start: 2024-04-07 | End: 2024-04-07

## 2024-04-07 RX ADMIN — KETOROLAC TROMETHAMINE 15 MG: 30 INJECTION, SOLUTION INTRAMUSCULAR; INTRAVENOUS at 16:15

## 2024-04-07 RX ADMIN — ONDANSETRON 4 MG: 2 INJECTION INTRAMUSCULAR; INTRAVENOUS at 16:15

## 2024-04-07 ASSESSMENT — PAIN SCALES - GENERAL
PAINLEVEL_OUTOF10: 8
PAINLEVEL_OUTOF10: 8

## 2024-04-07 ASSESSMENT — PAIN DESCRIPTION - LOCATION: LOCATION: ABDOMEN

## 2024-04-07 ASSESSMENT — PAIN DESCRIPTION - DESCRIPTORS: DESCRIPTORS: ACHING;CRAMPING

## 2024-04-07 ASSESSMENT — PAIN - FUNCTIONAL ASSESSMENT: PAIN_FUNCTIONAL_ASSESSMENT: 0-10

## 2024-04-07 NOTE — ED NOTES
Patient  given copy of dc instructions and 1 script(s). Patient  verbalized understanding of instructions and script (s). Patient given a current medication reconciliation form and verbalized understanding of their medications. Patient  verbalized understanding of the importance of discussing medications with his or her physician or clinic they will be following up with. Patient alert and oriented and in no acute distress. Patient discharged home, patient offered wheelchair, patient declines wheelchair.

## 2024-04-07 NOTE — ED PROVIDER NOTES
[facility] EMERGENCY DEPT  EMERGENCY DEPARTMENT ENCOUNTER       Pt Name: Susana Vallejo  MRN: [id number]  Birthdate 2005  Date of evaluation: 4/7/2024  Provider: Nannette Coles MD   PCP: Unknown, Provider, TC - NP  Note Started: 4:09 PM EDT 4/7/24     CHIEF COMPLAINT       Chief Complaint   Patient presents with    Vaginal Bleeding        HISTORY OF PRESENT ILLNESS: 1 or more elements      History From: patient, History limited by: none     Susana Vallejo is a 18 y.o. female who presents with a cc of lower abdominal pain and vaginal bleeding       Please See MDM for Additional Details of the HPI/PMH  Nursing Notes were all reviewed and agreed with or any disagreements were addressed in the HPI.     REVIEW OF SYSTEMS        Positives and Pertinent negatives as per HPI.    PAST HISTORY     Past Medical History:  History reviewed. No pertinent past medical history.    Past Surgical History:  History reviewed. No pertinent surgical history.    Family History:  History reviewed. No pertinent family history.    Social History:  Social History     Tobacco Use    Smoking status: Never    Smokeless tobacco: Never   Vaping Use    Vaping Use: Never used   Substance Use Topics    Alcohol use: Never    Drug use: Never       Allergies:  No Known Allergies    CURRENT MEDICATIONS      Discharge Medication List as of 4/7/2024  5:17 PM        CONTINUE these medications which have NOT CHANGED    Details   Acetaminophen (TYLENOL PO) Take by mouthHistorical Med      prednisoLONE 5 MG (21) TBPK Use as directed, Disp-21 each, R-0Normal      hydrocortisone 0.5 % cream Apply topically 2 times daily., Disp-28 g, R-0, Normal      dicyclomine (BENTYL) 20 MG tablet Take 1 tablet by mouth every 6 hours as neededHistorical Med      polyethylene glycol (GOLYTELY) 236 g solution Drink 6 ounces every 20 minutes until you have had 2-3 bowel movements  Indications: Bowel evacuationHistorical Med             SCREENINGS               No data recorded

## 2024-04-07 NOTE — ED TRIAGE NOTES
Patient presents to ED with c/o vaginal bleeding with abdominal cramping that began today. Patient states that her LMP was three weeks ago

## 2024-04-07 NOTE — ED NOTES
Patient here with c/o vaginal bleeding, with pelvic and lower abdominal cramping.  Patient states that cramping began a couple days ago but states that her concern grew today when she started having vaginal bleeding.  Patient states that she had her last menstrual cycle between 2 to 3 weeks ago, states that she had a lot of worry because she is \"bleeding too soon\".  Patient also reports that she recently stopped taking her birth control medication, but patient cannot report exact date.  Patient denies fevers, denies dysuria, denies vomiting.  Patient states that her last bowel movement was yesterday and states \"at first it was normal but then it was not.\"            Emergency Department Nursing Plan of Care       The Nursing Plan of Care is developed from the Nursing assessment and Emergency Department Attending provider initial evaluation.  The plan of care may be reviewed in the “ED Provider note”.      The Plan of Care was developed with the following considerations:  Patient / Family readiness to learn indicated by:verbalized understanding  Persons(s) to be included in education: patient  Barriers to Learning/Limitations:None      Signed     Kadie Joseph RN    4/7/2024   3:55 PM

## 2024-05-17 ENCOUNTER — HOSPITAL ENCOUNTER (EMERGENCY)
Facility: HOSPITAL | Age: 19
Discharge: HOME | End: 2024-05-17
Payer: COMMERCIAL

## 2024-05-17 VITALS
TEMPERATURE: 98.1 F | BODY MASS INDEX: 19.33 KG/M2 | OXYGEN SATURATION: 99 % | HEIGHT: 65 IN | SYSTOLIC BLOOD PRESSURE: 129 MMHG | RESPIRATION RATE: 16 BRPM | WEIGHT: 116 LBS | DIASTOLIC BLOOD PRESSURE: 76 MMHG | HEART RATE: 62 BPM

## 2024-05-17 DIAGNOSIS — Z32.02 ENCOUNTER FOR PREGNANCY TEST WITH RESULT NEGATIVE: ICD-10-CM

## 2024-05-17 DIAGNOSIS — N76.0 ACUTE VAGINITIS: Primary | ICD-10-CM

## 2024-05-17 LAB
APPEARANCE UR: CLEAR
BACTERIA URNS QL MICRO: NEGATIVE /HPF
BILIRUB UR QL: NEGATIVE
C TRACH DNA SPEC QL NAA+PROBE: NEGATIVE
CLUE CELLS VAG QL WET PREP: NORMAL
COLOR UR: ABNORMAL
EPITH CASTS URNS QL MICRO: ABNORMAL /LPF
GLUCOSE UR STRIP.AUTO-MCNC: NEGATIVE MG/DL
HCG UR QL: NEGATIVE
HGB UR QL STRIP: NEGATIVE
KETONES UR QL STRIP.AUTO: NEGATIVE MG/DL
LEUKOCYTE ESTERASE UR QL STRIP.AUTO: NEGATIVE
N GONORRHOEA DNA SPEC QL NAA+PROBE: NEGATIVE
NITRITE UR QL STRIP.AUTO: NEGATIVE
PH UR STRIP: 6 (ref 5–8)
PROT UR STRIP-MCNC: NEGATIVE MG/DL
RBC #/AREA URNS HPF: ABNORMAL /HPF (ref 0–5)
SAMPLE TYPE: NORMAL
SERVICE CMNT-IMP: NORMAL
SP GR UR REFRACTOMETRY: 1.01
SPECIMEN SOURCE: NORMAL
T VAGINALIS VAG QL WET PREP: NORMAL
URINE CULTURE IF INDICATED: ABNORMAL
UROBILINOGEN UR QL STRIP.AUTO: 1 EU/DL (ref 0.2–1)
WBC URNS QL MICRO: ABNORMAL /HPF (ref 0–4)
YEAST: NORMAL

## 2024-05-17 PROCEDURE — 87210 SMEAR WET MOUNT SALINE/INK: CPT

## 2024-05-17 PROCEDURE — 87591 N.GONORRHOEAE DNA AMP PROB: CPT

## 2024-05-17 PROCEDURE — 87491 CHLMYD TRACH DNA AMP PROBE: CPT

## 2024-05-17 PROCEDURE — 99283 EMERGENCY DEPT VISIT LOW MDM: CPT

## 2024-05-17 PROCEDURE — 81001 URINALYSIS AUTO W/SCOPE: CPT

## 2024-05-17 PROCEDURE — 81025 URINE PREGNANCY TEST: CPT

## 2024-05-17 ASSESSMENT — PAIN - FUNCTIONAL ASSESSMENT: PAIN_FUNCTIONAL_ASSESSMENT: 0-10

## 2024-05-17 ASSESSMENT — PAIN SCALES - GENERAL: PAINLEVEL_OUTOF10: 8

## 2024-05-17 ASSESSMENT — PAIN DESCRIPTION - LOCATION: LOCATION: ABDOMEN

## 2024-05-17 ASSESSMENT — PAIN DESCRIPTION - DESCRIPTORS: DESCRIPTORS: CRAMPING

## 2024-05-17 NOTE — ED TRIAGE NOTES
Pt denies N/V/D  Pt states she \"doesn't remember when last cycle was\"  Denies pain/burning with urination.   Last BM was today.

## 2024-05-17 NOTE — ED PROVIDER NOTES
[facility] EMERGENCY DEPT  EMERGENCY DEPARTMENT ENCOUNTER       Pt Name: Susana Vallejo  MRN: [id number]  Birthdate 2005  Date of evaluation: 5/17/2024  Provider: TC Marroquin CNP   PCP: Unknown, Provider, APRN - NP  Note Started:  1:34 PM EDT 5/17/24     CHIEF COMPLAINT       Chief Complaint   Patient presents with    Pregnancy Test     Pt arrives ambulatory stating she took 4 pregnancy test at home and saw \"faint line\".         HISTORY OF PRESENT ILLNESS: 1 or more elements      History From: Patient  HPI Limitations: None     Susana Vallejo is a 18 y.o. female who presents complaining of vaginal discharge, abdominal pain, headache, and  back pain x 1 week.  Patient describes the discharge is yellow with mild odor.  Reports taking home pregnancy test 4 times and noted a faint positive line.  Reports nipple sensitivity this morning.  Reports LMP about 2 weeks ago.     Nursing Notes were all reviewed and agreed with or any disagreements were addressed in the HPI.     REVIEW OF SYSTEMS      Review of Systems     Positives and Pertinent negatives as per HPI.    PAST HISTORY     Past Medical History:  No past medical history on file.    Past Surgical History:  No past surgical history on file.    Family History:  No family history on file.    Social History:  Social History     Tobacco Use    Smoking status: Never    Smokeless tobacco: Never   Vaping Use    Vaping Use: Never used   Substance Use Topics    Alcohol use: Never    Drug use: Never       Allergies:  No Known Allergies    CURRENT MEDICATIONS      Previous Medications    ACETAMINOPHEN (TYLENOL PO)    Take by mouth    DICYCLOMINE (BENTYL) 20 MG TABLET    Take 1 tablet by mouth every 6 hours as needed    HYDROCORTISONE 0.5 % CREAM    Apply topically 2 times daily.    NAPROXEN (NAPROSYN) 500 MG TABLET    Take 1 tablet by mouth 2 times daily (with meals)    POLYETHYLENE GLYCOL (GOLYTELY) 236 G SOLUTION    Drink 6 ounces every 20 minutes until you have had